# Patient Record
Sex: MALE | Race: WHITE | NOT HISPANIC OR LATINO
[De-identification: names, ages, dates, MRNs, and addresses within clinical notes are randomized per-mention and may not be internally consistent; named-entity substitution may affect disease eponyms.]

---

## 2024-07-23 ENCOUNTER — TRANSCRIPTION ENCOUNTER (OUTPATIENT)
Age: 39
End: 2024-07-23

## 2024-07-24 ENCOUNTER — NON-APPOINTMENT (OUTPATIENT)
Age: 39
End: 2024-07-24

## 2024-07-29 PROBLEM — Z00.00 ENCOUNTER FOR PREVENTIVE HEALTH EXAMINATION: Status: ACTIVE | Noted: 2024-07-29

## 2024-07-30 ENCOUNTER — APPOINTMENT (OUTPATIENT)
Dept: SURGERY | Facility: CLINIC | Age: 39
End: 2024-07-30
Payer: COMMERCIAL

## 2024-07-30 VITALS
SYSTOLIC BLOOD PRESSURE: 120 MMHG | HEIGHT: 67 IN | OXYGEN SATURATION: 98 % | DIASTOLIC BLOOD PRESSURE: 70 MMHG | HEART RATE: 65 BPM | BODY MASS INDEX: 26.21 KG/M2 | WEIGHT: 167.03 LBS | TEMPERATURE: 98 F | RESPIRATION RATE: 16 BRPM

## 2024-07-30 DIAGNOSIS — M86.9 OSTEOMYELITIS, UNSPECIFIED: ICD-10-CM

## 2024-07-30 DIAGNOSIS — K40.90 UNILATERAL INGUINAL HERNIA, W/OUT OBSTRUCTION OR GANGRENE, NOT SPECIFIED AS RECURRENT: ICD-10-CM

## 2024-07-30 DIAGNOSIS — Z78.9 OTHER SPECIFIED HEALTH STATUS: ICD-10-CM

## 2024-07-30 DIAGNOSIS — R10.32 LEFT LOWER QUADRANT PAIN: ICD-10-CM

## 2024-07-30 DIAGNOSIS — Z82.49 FAMILY HISTORY OF ISCHEMIC HEART DISEASE AND OTHER DISEASES OF THE CIRCULATORY SYSTEM: ICD-10-CM

## 2024-07-30 PROCEDURE — 99204 OFFICE O/P NEW MOD 45 MIN: CPT

## 2024-07-30 NOTE — PHYSICAL EXAM
[Normal Breath Sounds] : Normal breath sounds [Normal Heart Sounds] : normal heart sounds [de-identified] : No acute distress [de-identified] : PERRL EOMI anicteric sclera pink moist oral mucosa [de-identified] : Supple [de-identified] : No obvious epigastric bulges or umbilical bulges or right groin bulges on Valsalva.  Very minimal bulge noted on Valsalva left groin with a relaxed and widened superficial ring.  During core engagement patient appreciates tenderness at the left pubis attachment for the rectus abdominis.  There is no tenderness noted at the adductor longus bilaterally during attachment at the pelvis while engaging his abductors.

## 2024-07-30 NOTE — PLAN
[FreeTextEntry1] : Athletic pubalgia left osteitis pubis and left inguinal hernia which is fatty containing and a small.  CAT scan of the abdomen pelvis recently done have been independently reviewed by myself with the patient with CDs provided by him from [employer].  There is an area of a left inguinal hernia and it looks like it also involves the direct space.  I am unable to see the images that pertain to the osteitis pubis. Based on these findings recommendations are for 12 weeks worth of dedicated rest and physical therapy to strengthen that area, and for 3 days worth of Motrin orally 600 mg 3 times a day.  Ice packs and heat to be alternated and used it based on comfort.  I have invited a phone call from his physical therapist if he needs clarification on my clinical findings.  I would like to see the patient back in about 4 months and see if there is any improvement.  Discussion about management including conservative which is physical therapy and rest for the time being, then image directed injections preferable but can be based on area of most intense discomfort with local anesthetic, steroid, PRP and or surgical repair using a pure tissue repair for the left floor with reinforcement of the abductor longus and rectus abdominis muscle on the left side onto the pubis.  Plus or minus partial release of the left adductor longus at the level of the pubis.  Risks benefits and publications of all these interventions have been discussed with him at length.  I have also referred him to explore further and gain more information and insight about this on the [facility] website.  And go for second opinion if needed.

## 2024-07-30 NOTE — REASON FOR VISIT
[Initial Evaluation] : an initial evaluation [FreeTextEntry1] : 2 months of exquisite pain with activity during activity and the left groin towards the midline.  He has had a recent CAT scan of the abdomen pelvis showing osteitis pubis-itis.  And also shows that he has a small fatty containing left inguinal hernia.  He denies any obstructive symptoms.  But this seems to be encroaching on his ability to stay physically active which includes [employer] training and community league soccer.

## 2024-11-04 ENCOUNTER — APPOINTMENT (OUTPATIENT)
Dept: SURGERY | Facility: CLINIC | Age: 39
End: 2024-11-04
Payer: COMMERCIAL

## 2024-11-04 DIAGNOSIS — R10.32 LEFT LOWER QUADRANT PAIN: ICD-10-CM

## 2024-11-04 DIAGNOSIS — M86.9 OSTEOMYELITIS, UNSPECIFIED: ICD-10-CM

## 2024-11-04 DIAGNOSIS — K40.90 UNILATERAL INGUINAL HERNIA, W/OUT OBSTRUCTION OR GANGRENE, NOT SPECIFIED AS RECURRENT: ICD-10-CM

## 2024-11-04 PROCEDURE — 99213 OFFICE O/P EST LOW 20 MIN: CPT

## 2024-11-04 NOTE — PHYSICAL EXAM
[de-identified] : No acute distress [de-identified] : Reports no obvious bulges in bilateral groins.

## 2024-11-04 NOTE — REVIEW OF SYSTEMS
[Negative] : Heme/Lymph [FreeTextEntry7] : Left groin pain significantly improved with rest and physical therapy

## 2024-11-04 NOTE — PLAN
[FreeTextEntry1] : Athletic pubalgia left groin, significantly improved symptomatically.  Patient will continue to increase level of exercise and activity preparation to get back to soccer.  He is pleased with this avenue conservative therapy.  He will be sure to share his activities and strengthening with his physical therapist.  He is also shared the name of his physical therapist for further  reference. Follow-up at 1 year.

## 2024-11-04 NOTE — REASON FOR VISIT
[Follow-Up: _____] : a [unfilled] follow-up visit [FreeTextEntry1] : 39-year-old male presents in follow-up after being seen for left groin pain imaging demonstrating osteitis pubis left groin consistent with physical exam and a fatty containing left inguinal hernia.  Patient was recommended conservative therapy including anti-inflammatories physical therapy and rest.  He presents in follow-up today reports that he has been doing the physical therapy now for 5 months and feels significantly better with the activities focusing mostly on core strength and alignment.  And is grateful that he took this approach.  He reports he will share with me the exercises that he has been doing.  He appreciates no obvious increased bulges in the left groin.  He is planning to increase his level of activity at this point in order to get back to soccer.

## 2025-07-05 ENCOUNTER — NON-APPOINTMENT (OUTPATIENT)
Age: 40
End: 2025-07-05